# Patient Record
Sex: MALE | Race: WHITE | Employment: FULL TIME | URBAN - METROPOLITAN AREA
[De-identification: names, ages, dates, MRNs, and addresses within clinical notes are randomized per-mention and may not be internally consistent; named-entity substitution may affect disease eponyms.]

---

## 2017-01-13 ENCOUNTER — HOSPITAL ENCOUNTER (OUTPATIENT)
Age: 27
Discharge: HOME | End: 2017-01-13
Attending: EMERGENCY MEDICINE
Payer: COMMERCIAL

## 2017-01-13 VITALS
HEIGHT: 72 IN | SYSTOLIC BLOOD PRESSURE: 143 MMHG | WEIGHT: 235 LBS | DIASTOLIC BLOOD PRESSURE: 84 MMHG | HEART RATE: 100 BPM | TEMPERATURE: 97 F | RESPIRATION RATE: 18 BRPM | OXYGEN SATURATION: 99 % | BODY MASS INDEX: 31.83 KG/M2

## 2017-01-13 DIAGNOSIS — L03.012 PARONYCHIA, LEFT: Primary | ICD-10-CM

## 2017-01-13 PROCEDURE — 99204 OFFICE O/P NEW MOD 45 MIN: CPT

## 2017-01-13 PROCEDURE — 99203 OFFICE O/P NEW LOW 30 MIN: CPT

## 2017-01-13 RX ORDER — CEPHALEXIN 500 MG/1
500 CAPSULE ORAL 4 TIMES DAILY
Qty: 40 CAPSULE | Refills: 0 | Status: SHIPPED | OUTPATIENT
Start: 2017-01-13 | End: 2017-01-23

## 2017-01-13 NOTE — ED INITIAL ASSESSMENT (HPI)
Pt cut finger on utility knife 2 weeks ago. Pt washed hands after but wasn't seen for it. Pt has been soaking for past few nights but c/o drainage and redness to finger. Painful to touch. Last tetanus 2 years ago.

## 2017-01-13 NOTE — ED PROVIDER NOTES
Patient presents with:  Laceration Abrasion (integumentary)    HPI:     Michaela Lugo is a 32year old male who presents with chief complaint of L thumb pain.   About 2 weeks ago pt was using a utility knife at home and cut the L thumb on the radial aspect discharge instructions.

## 2017-02-04 ENCOUNTER — CHARTING TRANS (OUTPATIENT)
Dept: URGENT CARE | Age: 27
End: 2017-02-04

## 2017-02-04 ASSESSMENT — PAIN SCALES - GENERAL: PAINLEVEL_OUTOF10: 5

## 2017-05-25 ENCOUNTER — HOSPITAL ENCOUNTER (OUTPATIENT)
Age: 27
Discharge: HOME | End: 2017-05-25
Attending: FAMILY MEDICINE
Payer: COMMERCIAL

## 2017-05-25 VITALS
BODY MASS INDEX: 35.21 KG/M2 | HEIGHT: 72 IN | SYSTOLIC BLOOD PRESSURE: 126 MMHG | WEIGHT: 260 LBS | TEMPERATURE: 98 F | OXYGEN SATURATION: 98 % | DIASTOLIC BLOOD PRESSURE: 83 MMHG | HEART RATE: 90 BPM | RESPIRATION RATE: 18 BRPM

## 2017-05-25 DIAGNOSIS — J01.90 ACUTE SINUSITIS, RECURRENCE NOT SPECIFIED, UNSPECIFIED LOCATION: Primary | ICD-10-CM

## 2017-05-25 PROCEDURE — 99214 OFFICE O/P EST MOD 30 MIN: CPT

## 2017-05-25 PROCEDURE — 99213 OFFICE O/P EST LOW 20 MIN: CPT

## 2017-05-25 RX ORDER — PREDNISONE 20 MG/1
40 TABLET ORAL DAILY
Qty: 10 TABLET | Refills: 0 | Status: SHIPPED | OUTPATIENT
Start: 2017-05-25 | End: 2017-05-30

## 2017-05-25 RX ORDER — AMOXICILLIN AND CLAVULANATE POTASSIUM 875; 125 MG/1; MG/1
1 TABLET, FILM COATED ORAL 2 TIMES DAILY
Qty: 20 TABLET | Refills: 0 | Status: SHIPPED | OUTPATIENT
Start: 2017-05-25 | End: 2017-06-04

## 2017-05-25 NOTE — ED PROVIDER NOTES
Patient presents with:  Cough/URI  Ear Pain  Sore Throat  Fever (infectious)  Body ache and/or chills    HPI:     Teresa Barajas is a 32year old male who presents with a chief complaint of sinus pain/pressure, facial pain, frontal headache, nasal congestio bilateral  Throat: lips, mucosa, and tongue normal; teeth and gums normal  Neck: tender anterior cervical adenopathy and supple, symmetrical, trachea midline  Back: symmetric, no curvature. ROM normal. No CVA tenderness.   Lungs: clear to auscultation bilat

## 2017-07-08 ENCOUNTER — LAB SERVICES (OUTPATIENT)
Dept: OTHER | Age: 27
End: 2017-07-08

## 2017-07-08 ENCOUNTER — CHARTING TRANS (OUTPATIENT)
Dept: URGENT CARE | Age: 27
End: 2017-07-08

## 2017-07-08 LAB
CLARITY: CLEAR
COLOR: ABNORMAL
HIV 1/2 ANTIBODY: NORMAL
HIV-1 P24 ANTIGEN: NORMAL
SPECIFIC GRAVITY URINE: 1.01 (ref 1–1.03)

## 2017-07-08 ASSESSMENT — PAIN SCALES - GENERAL: PAINLEVEL_OUTOF10: 4

## 2017-07-09 LAB — RPR SER QL: NORMAL

## 2017-07-10 LAB — FINAL REPORT: NORMAL

## 2017-07-11 LAB
C TRACH DNA SPEC QL NAA+PROBE: NEGATIVE
N GONORRHOEA DNA SPEC QL NAA+PROBE: NEGATIVE

## 2017-12-04 ENCOUNTER — HOSPITAL ENCOUNTER (OUTPATIENT)
Age: 27
Discharge: HOME | End: 2017-12-04
Attending: FAMILY MEDICINE
Payer: COMMERCIAL

## 2017-12-04 VITALS
HEIGHT: 72 IN | RESPIRATION RATE: 16 BRPM | DIASTOLIC BLOOD PRESSURE: 82 MMHG | OXYGEN SATURATION: 97 % | SYSTOLIC BLOOD PRESSURE: 136 MMHG | HEART RATE: 81 BPM | BODY MASS INDEX: 33.86 KG/M2 | WEIGHT: 250 LBS | TEMPERATURE: 98 F

## 2017-12-04 DIAGNOSIS — J01.00 ACUTE MAXILLARY SINUSITIS, RECURRENCE NOT SPECIFIED: Primary | ICD-10-CM

## 2017-12-04 PROCEDURE — 99213 OFFICE O/P EST LOW 20 MIN: CPT

## 2017-12-04 PROCEDURE — 99214 OFFICE O/P EST MOD 30 MIN: CPT

## 2017-12-04 RX ORDER — PREDNISONE 20 MG/1
40 TABLET ORAL DAILY
Qty: 10 TABLET | Refills: 0 | Status: SHIPPED | OUTPATIENT
Start: 2017-12-04 | End: 2017-12-09

## 2017-12-04 RX ORDER — AMOXICILLIN AND CLAVULANATE POTASSIUM 875; 125 MG/1; MG/1
1 TABLET, FILM COATED ORAL 2 TIMES DAILY
Qty: 20 TABLET | Refills: 0 | Status: SHIPPED | OUTPATIENT
Start: 2017-12-04 | End: 2017-12-14

## 2017-12-04 NOTE — ED PROVIDER NOTES
Patient presents with:  Sinus Problem    HPI:     Faiza Casas is a 32year old male who presents with a chief complaint of sinus pain/pressure, facial pain, frontal headache, nasal congestion, thick colored nasal drainage, loss of smell, post nasal drain frontal sinus tenderness bilateral  Throat: lips, mucosa, and tongue normal; teeth and gums normal  Neck: no adenopathy and supple, symmetrical, trachea midline  Back: symmetric, no curvature. ROM normal. No CVA tenderness.   Lungs: clear to auscultation bi

## 2017-12-04 NOTE — ED INITIAL ASSESSMENT (HPI)
Patient c/o sinus pain, pressure, and congestion for 2 weeks. Feels body aches intermittently as well. No fever. Pt taking advil cold and sinus and doing nasal rinses at home.

## 2018-08-28 ENCOUNTER — CHARTING TRANS (OUTPATIENT)
Dept: OTHER | Age: 28
End: 2018-08-28

## 2018-08-28 ENCOUNTER — LAB SERVICES (OUTPATIENT)
Dept: OTHER | Age: 28
End: 2018-08-28

## 2018-08-28 ASSESSMENT — PAIN SCALES - GENERAL: PAINLEVEL_OUTOF10: 0

## 2018-08-31 LAB — AP REPORT: NORMAL

## 2018-09-04 ENCOUNTER — CHARTING TRANS (OUTPATIENT)
Dept: OTHER | Age: 28
End: 2018-09-04

## 2018-09-04 ASSESSMENT — PAIN SCALES - GENERAL: PAINLEVEL_OUTOF10: 0

## 2018-09-15 NOTE — ED INITIAL ASSESSMENT (HPI)
Sinus congestion and drainage for 10 days. Sore throat started about 3 days ago, sore neck and swollen glands. Feverish and body aches. 15-Sep-2018 13:48

## 2018-11-28 VITALS
HEART RATE: 77 BPM | TEMPERATURE: 98.6 F | DIASTOLIC BLOOD PRESSURE: 80 MMHG | RESPIRATION RATE: 16 BRPM | OXYGEN SATURATION: 96 % | SYSTOLIC BLOOD PRESSURE: 120 MMHG

## 2018-11-29 VITALS
TEMPERATURE: 97.9 F | SYSTOLIC BLOOD PRESSURE: 130 MMHG | HEART RATE: 86 BPM | OXYGEN SATURATION: 97 % | RESPIRATION RATE: 16 BRPM | DIASTOLIC BLOOD PRESSURE: 90 MMHG

## 2019-01-08 ENCOUNTER — HOSPITAL ENCOUNTER (OUTPATIENT)
Age: 29
Discharge: HOME | End: 2019-01-08
Payer: COMMERCIAL

## 2019-01-08 VITALS
SYSTOLIC BLOOD PRESSURE: 120 MMHG | WEIGHT: 220 LBS | RESPIRATION RATE: 16 BRPM | HEART RATE: 72 BPM | TEMPERATURE: 98 F | OXYGEN SATURATION: 98 % | BODY MASS INDEX: 30 KG/M2 | DIASTOLIC BLOOD PRESSURE: 78 MMHG

## 2019-01-08 DIAGNOSIS — H65.193 ACUTE EFFUSION OF BOTH MIDDLE EARS: ICD-10-CM

## 2019-01-08 DIAGNOSIS — J34.89 SINUS DRAINAGE: ICD-10-CM

## 2019-01-08 DIAGNOSIS — R11.0 NAUSEA: ICD-10-CM

## 2019-01-08 DIAGNOSIS — A09 TRAVELER'S DIARRHEA: Primary | ICD-10-CM

## 2019-01-08 LAB
#LYMPHOCYTE IC: 1.4 X10ˆ3/UL (ref 0.9–3.2)
#MXD IC: 0.8 X10ˆ3/UL (ref 0.1–1)
#NEUTROPHIL IC: 8.5 X10ˆ3/UL (ref 1.3–6.7)
CREAT SERPL-MCNC: 0.8 MG/DL (ref 0.7–1.3)
GLUCOSE BLD-MCNC: 92 MG/DL (ref 70–99)
HCT IC: 41.3 % (ref 37–54)
HGB IC: 14.4 G/DL (ref 13–17)
ISTAT BUN: 14 MG/DL (ref 8–20)
ISTAT CHLORIDE: 100 MMOL/L (ref 101–111)
ISTAT HEMATOCRIT: 43 % (ref 37–53)
ISTAT IONIZED CALCIUM: 1.18 MMOL/L
ISTAT POTASSIUM: 3.8 MMOL/L (ref 3.6–5.1)
ISTAT SODIUM: 140 MMOL/L (ref 136–144)
LYMPHOCYTES NFR BLD AUTO: 12.8 %
MCH IC: 28.6 PG (ref 27–33.2)
MCHC IC: 34.9 G/DL (ref 31–37)
MCV IC: 81.9 FL (ref 80–99)
MIXED CELL %: 7.3 %
NEUTROPHILS NFR BLD AUTO: 79.9 %
PLT IC: 258 X10ˆ3/UL (ref 150–450)
RBC IC: 5.04 X10ˆ6/UL (ref 4.3–5.7)
WBC IC: 10.7 X10ˆ3/UL (ref 4–13)

## 2019-01-08 PROCEDURE — 99214 OFFICE O/P EST MOD 30 MIN: CPT

## 2019-01-08 PROCEDURE — 96360 HYDRATION IV INFUSION INIT: CPT

## 2019-01-08 PROCEDURE — 80047 BASIC METABLC PNL IONIZED CA: CPT

## 2019-01-08 PROCEDURE — 85025 COMPLETE CBC W/AUTO DIFF WBC: CPT | Performed by: PHYSICIAN ASSISTANT

## 2019-01-08 RX ORDER — CIPROFLOXACIN 500 MG/1
500 TABLET, FILM COATED ORAL 2 TIMES DAILY
Qty: 6 TABLET | Refills: 0 | Status: SHIPPED | OUTPATIENT
Start: 2019-01-08 | End: 2019-01-11

## 2019-01-08 RX ORDER — ONDANSETRON 4 MG/1
4 TABLET, ORALLY DISINTEGRATING ORAL EVERY 4 HOURS PRN
Qty: 20 TABLET | Refills: 0 | Status: SHIPPED | OUTPATIENT
Start: 2019-01-08

## 2019-01-08 RX ORDER — ONDANSETRON 4 MG/1
4 TABLET, ORALLY DISINTEGRATING ORAL ONCE
Status: COMPLETED | OUTPATIENT
Start: 2019-01-08 | End: 2019-01-08

## 2019-01-08 RX ORDER — SODIUM CHLORIDE 9 MG/ML
1000 INJECTION, SOLUTION INTRAVENOUS ONCE
Status: COMPLETED | OUTPATIENT
Start: 2019-01-08 | End: 2019-01-08

## 2019-01-08 NOTE — ED PROVIDER NOTES
Patient Seen in: [address] [facility]    History   Patient presents with:  Sinusitis    Stated Complaint: sinus congestion    HPI    70-year-old male here with complaint of sinus pain and pressure with purulent drainage in tandem with chills a Tympanic membrane is bulging. Left Ear: External ear normal. Tympanic membrane is bulging. Nose: Mucosal edema and rhinorrhea present.    Mouth/Throat: Uvula is midline, oropharynx is clear and moist and mucous membranes are normal.   Eyes: Conjunctivae Disposition and Plan     Clinical Impression:  Traveler's diarrhea  (primary encounter diagnosis)  Nausea  Acute effusion of both middle ears  Sinus drainage    Disposition:  Discharge  1/8/2019 12:49 pm    Follow-up:  Cate Bain DO  [address]

## 2019-01-08 NOTE — ED INITIAL ASSESSMENT (HPI)
Flew from [facility] Saturday and by Sunday had chills, body aches, sinus pressure, diarrhea and today pain has worsened in sinuses and ears.

## 2019-03-05 VITALS
HEART RATE: 76 BPM | RESPIRATION RATE: 20 BRPM | DIASTOLIC BLOOD PRESSURE: 86 MMHG | TEMPERATURE: 98.9 F | SYSTOLIC BLOOD PRESSURE: 136 MMHG | OXYGEN SATURATION: 97 %

## 2019-03-05 VITALS
RESPIRATION RATE: 16 BRPM | OXYGEN SATURATION: 98 % | DIASTOLIC BLOOD PRESSURE: 78 MMHG | SYSTOLIC BLOOD PRESSURE: 122 MMHG | TEMPERATURE: 97.8 F | HEART RATE: 62 BPM

## (undated) NOTE — ED AVS SNAPSHOT
[facility] in [address] [address]    Phone:  [phone]    Fax:  [address]   MRN: [id number]    Department:  [facility] in [facility]   Date of Visit:  1/13/2017           Melinda To Check ER Wait Times:  TEXT 'ERwait' to 59958      Click [email or web address]      Or call [phone]    If you have any problems with your follow-up, please call our  at [phone].     Si usted tiene algun problema con johnson sequimiento, por I have read and understand the instructions given to me by my caregivers. [facility]        Pharmacy Address Phone Number   [phone] [facility] ([address]) [address] [place] [address].  [facility] & visit,  view other health information, and more. To sign up or find more information, go to [email or web address] and click on the Sign Up Now link in the [employer] box.      Enter your [employer] Activation Code exactly as it appears below along with yo

## (undated) NOTE — ED AVS SNAPSHOT
[facility] in [address] [address]    Phone:  [phone]    Fax:  [address] [place]   MRN: [id number]    Department[facility]   Date of Visit:  5/25/2017           Melinda To Check ER Wait Times:  TEXT 'ERwait' to 5[address]15      Click [email or web address]      Or call [phone]    If you have any problems with your follow-up, please call our  at [phone].     Si usted tiene algun problema con johnson sequimiento, por I have read and understand the instructions given to me by my caregivers. [facility]        Pharmacy Address Phone Number   [phone] ([address]) Ioana Pradhan visit,  view other health information, and more. To sign up or find more information, go to [email or web address] and click on the Sign Up Now link in the [employer] box.      Enter your [employer] Activation Code exactly as it appears below along with yo